# Patient Record
Sex: MALE | Race: WHITE
[De-identification: names, ages, dates, MRNs, and addresses within clinical notes are randomized per-mention and may not be internally consistent; named-entity substitution may affect disease eponyms.]

---

## 2022-05-06 ENCOUNTER — NON-APPOINTMENT (OUTPATIENT)
Age: 57
End: 2022-05-06

## 2022-05-26 PROBLEM — Z00.00 ENCOUNTER FOR PREVENTIVE HEALTH EXAMINATION: Status: ACTIVE | Noted: 2022-05-26

## 2022-06-03 ENCOUNTER — NON-APPOINTMENT (OUTPATIENT)
Age: 57
End: 2022-06-03

## 2022-06-13 ENCOUNTER — APPOINTMENT (OUTPATIENT)
Dept: COLORECTAL SURGERY | Facility: CLINIC | Age: 57
End: 2022-06-13
Payer: MEDICAID

## 2022-06-13 VITALS
BODY MASS INDEX: 29.12 KG/M2 | HEART RATE: 81 BPM | DIASTOLIC BLOOD PRESSURE: 97 MMHG | WEIGHT: 215 LBS | HEIGHT: 72 IN | TEMPERATURE: 97.9 F | SYSTOLIC BLOOD PRESSURE: 158 MMHG

## 2022-06-13 DIAGNOSIS — K64.8 OTHER HEMORRHOIDS: ICD-10-CM

## 2022-06-13 PROCEDURE — 46221 LIGATION OF HEMORRHOID(S): CPT

## 2022-06-13 PROCEDURE — 99202 OFFICE O/P NEW SF 15 MIN: CPT | Mod: 25

## 2022-06-13 NOTE — HISTORY OF PRESENT ILLNESS
[FreeTextEntry1] : 55 yo M presents for evaluation of hemorrhoids\par Denies PMH\par PSH: Laparoscopic b/l inguinal hernia repair with mesh\par \par Pt reports a long standing hx of hemorrhoidal disease. Previously he has had painless bright red rectal bleeding with bowel movements with both blood on the TP and in the bowl. More recently his bleeding is improved with change of diet however he does note continued reducible prolapsing tissue and an anorectal pulsation/throbbing worse with prolonged sitting.\par \par Denies painfull defecation or pruritus.\par \par BH:Once daily, formed, minimal straining\par Has recently switched to a higher fiber diet, however denies fiber supplementation\par Denies laxative use.\par \par Last colonoscopy 2-3 years prior and reportedly WNL\par Denies FMH colorectal CA\par Denies ASA/NSAIDs in last 7 days\par

## 2022-06-13 NOTE — PHYSICAL EXAM
[Excoriation] : no perianal excoriation [Skin Tags] : there were no residual hemorrhoidal skin tags seen [Normal] : was normal [None] : there was no rectal mass  [FreeTextEntry1] : Medical assistant was present for the entire exam.\par \par Anoscopy was performed for evaluation of the patients rectal bleeding  history .\par The risks, benefits and alternatives were reviewed.\par \par A lighted anoscope was passed into the anal canal and the entire anal mucosal surface was inspected..  \par The findings revealed large internal hemorrhoids.\par No masses or lesions were identified.\par \par The risks and benefits of rubber band ligation were discussed with the patient including but not limited to bleeding, pain, infection, and the need for future procedures. The anoscope was placed and rubber band ligation was performed of the internal hemorrhoids- RAQ and RPQ with good result. The patient tolerated the procedure well. Appropriate postprocedure instructions were given to the patient.\par